# Patient Record
Sex: MALE | Race: WHITE
[De-identification: names, ages, dates, MRNs, and addresses within clinical notes are randomized per-mention and may not be internally consistent; named-entity substitution may affect disease eponyms.]

---

## 2022-09-28 ENCOUNTER — HOSPITAL ENCOUNTER (OUTPATIENT)
Dept: HOSPITAL 47 - ORWHC2ENDO | Age: 52
Discharge: HOME | End: 2022-09-28
Attending: SURGERY
Payer: COMMERCIAL

## 2022-09-28 VITALS — RESPIRATION RATE: 16 BRPM | DIASTOLIC BLOOD PRESSURE: 64 MMHG | SYSTOLIC BLOOD PRESSURE: 104 MMHG

## 2022-09-28 VITALS — HEART RATE: 61 BPM

## 2022-09-28 DIAGNOSIS — Z83.79: ICD-10-CM

## 2022-09-28 DIAGNOSIS — Z12.11: Primary | ICD-10-CM

## 2022-09-28 DIAGNOSIS — Z98.890: ICD-10-CM

## 2022-09-28 DIAGNOSIS — K63.5: ICD-10-CM

## 2022-09-28 DIAGNOSIS — M54.2: ICD-10-CM

## 2022-09-28 DIAGNOSIS — K64.1: ICD-10-CM

## 2022-09-28 DIAGNOSIS — Z83.71: ICD-10-CM

## 2022-09-28 PROCEDURE — 88305 TISSUE EXAM BY PATHOLOGIST: CPT

## 2022-09-28 PROCEDURE — 45380 COLONOSCOPY AND BIOPSY: CPT

## 2022-09-28 RX ADMIN — POTASSIUM CHLORIDE SCH MLS: 14.9 INJECTION, SOLUTION INTRAVENOUS at 09:20

## 2022-09-28 RX ADMIN — POTASSIUM CHLORIDE SCH MLS: 14.9 INJECTION, SOLUTION INTRAVENOUS at 09:06

## 2022-09-28 NOTE — P.GSHP
History of Present Illness


H&P Date: 09/28/22














CHIEF COMPLAINT: Colon screen





HISTORY OF PRESENT ILLNESS: The patient is a 52-year-old male who


presents for colon screen.  Lower endoscopy was offered for further evaluation 

and management.





PAST MEDICAL HISTORY: 


Please see list.





PAST SURGICAL HISTORY: 


Please see list.





MEDICATIONS: 


Please see list.





ALLERGIES:  Please see list. 





SOCIAL HISTORY: No illicit drug use





FAMILY HISTORY: No reports of Crohn disease or ulcerative colitis. 





REVIEW OF ORGAN SYSTEMS: 


CONSTITUTIONAL: No reports of fevers or chills.  





PHYSICAL EXAM: 


VITAL SIGNS:  Stable


GENERAL: Well-developed pleasant in no acute distress. 


HEENT: No scleral icterus. Extraocular movements grossly


intact. Moist buccal mucosa. 


NECK: Supple without lymphadenopathy. 


CHEST: Unlabored respirations. Equal bilateral excursions. 


CARDIOVASCULAR: Regular rate and rhythm. Distal 2+ pulses. 


ABDOMEN: Soft, nontender, nondistended. 


MUSCULOSKELETAL: No clubbing, cyanosis, or edema. 





ASSESSMENT: 


1.  Colon screen.





PLAN: 


1. Recommend proceeding with a lower endoscopy





Past Medical History


Additional Past Medical History / Comment(s): achiness to neck r/t bone spur in 

neck makes index finger numb at times. occasional diarrhea


History of Any Multi-Drug Resistant Organisms: None Reported


Additional Past Surgical History / Comment(s): hernia repair -bilateral inguinal


Past Anesthesia/Blood Transfusion Reactions: No Reported Reaction


Smoking Status: Never smoker





- Past Family History


  ** Father


Additional Family Medical History / Comment(s): chrons





  ** Mother


Family Medical History: No Reported History





Medications and Allergies


                                Home Medications











 Medication  Instructions  Recorded  Confirmed  Type


 


Ibuprofen [Motrin Ib] 400 mg PO AS DIRECTED PRN 09/26/22 09/26/22 History


 


Unk 50  Alive Men's Vitamin 1 tab PO DAILY 09/26/22 09/26/22 History


 


Unk Ahswaganda 1 tab PO DAILY 09/26/22 09/26/22 History


 


Unk Chondroitin 1 tab PO DAILY 09/26/22 09/26/22 History


 


Unk Cranberry Supplement 1 tab PO DAILY 09/26/22 09/26/22 History


 


Unk Fish Oil 1 tab PO DAILY 09/26/22 09/26/22 History


 


Unk Lysine 1 tab PO DAILY 09/26/22 09/26/22 History


 


Unk Vitamin C 1 tab PO DAILY 09/26/22 09/26/22 History








                                    Allergies











Allergy/AdvReac Type Severity Reaction Status Date / Time


 


No Known Allergies Allergy   Verified 09/26/22 13:34

## 2022-09-28 NOTE — P.PCN
Date of Procedure: 09/28/22


Description of Procedure: 








PREOPERATIVE DIAGNOSIS:


Family history colon polyps


Colonoscopy screening





POSTOPERATIVE DIAGNOSIS:


Sigmoid colon polyp





OPERATION:


Colonoscopy to the ileocecal valve and appendiceal orifice, cecum


Colonoscopy with cold forceps biopsy





SURGEON: Stephanie Comer MD.





ANESTHESIA: MAC.





INDICATIONS:


The patient is an 52-year-old male who presents family history of colon polyps. 

This is his first colonoscopy.  Benefits and risks were described and informed 

consent was obtained.





DESCRIPTION OF PROCEDURE:


The patient had undergone Sutab prep.  The patient had been brought into the 

operating room and laid in the left lateral decubitus position.  After adequate 

intravenous sedation, the rectum was examined with 2% lidocaine jelly. The 

prostate was unremarkable.  No external hemorrhoids were encountered. The rectal

tone was within normal limits. No lesions were palpated in the rectal vault. An 

Olympus colonoscope was advanced until the cecum, ileocecal valve and 

appendiceal orifice were clearly viewed.  The prep was good.  No sigmoid 

diverticulosis was encountered.  Colonic polyps were found and removed.  No 

evidence of focal colitis was found. Retroflexion of the scope demonstrated 

grade 2 internal hemorrhoids without active bleeding or inflammation. The colon 

was desufflated. The patient had tolerated the procedure well. 





Withdrawal time was over 6 minutes.





FINDINGS:


Aronchick preparation quality scale 2 (1-5)


Internal hemorrhoids, grade 2


No external hemorrhoids, grade 4.


No arteriovenous malformations.


No sigmoid diverticulosis


Removal of 1 polyp:


- Cold forceps biopsy at 15 cm from the anal verge, 4 mm polyp, sigmoid colon


No focal colitis.





RECOMMENDATIONS:


Repeat colonoscopy 3 years, 2025





Plan - Discharge Summary


Discharge Rx Participant: No


New Discharge Prescriptions: 


Continue


   Unk Lysine 1 tab PO DAILY


   Unk Fish Oil 1 tab PO DAILY


   Unk Chondroitin 1 tab PO DAILY


   Unk 50  Alive Men's Vitamin 1 tab PO DAILY


   Unk Vitamin C 1 tab PO DAILY


   Unk Cranberry Supplement 1 tab PO DAILY


   Unk Ahswaganda 1 tab PO DAILY


   Ibuprofen [Motrin Ib] 400 mg PO AS DIRECTED PRN


     PRN Reason: Pain


Discharge Medication List





Ibuprofen [Motrin Ib] 400 mg PO AS DIRECTED PRN 09/26/22 [History]


Unk 50  Alive Men's Vitamin 1 tab PO DAILY 09/26/22 [History]


Unk Ahswaganda 1 tab PO DAILY 09/26/22 [History]


Unk Chondroitin 1 tab PO DAILY 09/26/22 [History]


Unk Cranberry Supplement 1 tab PO DAILY 09/26/22 [History]


Unk Fish Oil 1 tab PO DAILY 09/26/22 [History]


Unk Lysine 1 tab PO DAILY 09/26/22 [History]


Unk Vitamin C 1 tab PO DAILY 09/26/22 [History]








Follow up Appointment(s)/Referral(s): 


Stephanie Comer MD [STAFF PHYSICIAN] - As Needed


Patient Instructions/Handouts:  Colorectal Polyps (GEN)


Activity/Diet/Wound Care/Special Instructions: 


Repeat colonoscopy 3 years, 2025


Discharge Disposition: HOME SELF-CARE

## 2023-03-16 ENCOUNTER — HOSPITAL ENCOUNTER (OUTPATIENT)
Dept: HOSPITAL 47 - RADMAMWWP | Age: 53
Discharge: HOME | End: 2023-03-16
Attending: FAMILY MEDICINE
Payer: COMMERCIAL

## 2023-03-16 DIAGNOSIS — N63.20: ICD-10-CM

## 2023-03-16 DIAGNOSIS — N63.10: Primary | ICD-10-CM

## 2023-03-16 PROCEDURE — 77062 BREAST TOMOSYNTHESIS BI: CPT

## 2023-03-16 PROCEDURE — 77066 DX MAMMO INCL CAD BI: CPT

## 2023-03-16 NOTE — MM
Reason for Exam: Clinical finding. 

Indicated Problems: 

Lump or thickening of the right side (size 8) for 8 Month(s).





Tissue Density: 

The breast tissue is almost entirely fat.





Findings: 

Analyzed By CAD. 

No new suspicious masses, calcifications or distortions. 





Overall Assessment: Incomplete: need additional imaging evaluation, BI-RAD 0





Management: 

Diagnostic Breast Ultrasound of the right breast.

Electronically signed and approved by: Philip Warren DO

## 2023-03-16 NOTE — USB
Reason for Exam: Clinical finding. 

Technique: 

Method: Targeted.  





Findings: 

The lower section of the breast of the right breast was scanned.

Ultrasound imaging of palpable abnormality

Hyperechoic mass in the area of palpable abnormality in the upper abdomen very low chest is a 2.0 x

2.0 x 0.7 cm lesion. Lesion most compatible with fat-containing lesion such as lipoma. 





Overall Assessment: Benign, BI-RAD 2





Management: 

No follow up is required for this exam.

A clinical breast exam by your physician is recommended on an annual basis and results should be

correlated with mammographic findings.  This exam should not preclude additional follow-up of

suspicious palpable abnormalities.  Results were given to the patient verbally at the time of exam.



Electronically signed and approved by: Philip Warren DO